# Patient Record
Sex: MALE | Employment: UNEMPLOYED | URBAN - METROPOLITAN AREA
[De-identification: names, ages, dates, MRNs, and addresses within clinical notes are randomized per-mention and may not be internally consistent; named-entity substitution may affect disease eponyms.]

---
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
## 2025-02-20 ENCOUNTER — HOSPITAL ENCOUNTER (EMERGENCY)
Age: 20
Discharge: HOME | End: 2025-02-20
Attending: EMERGENCY MEDICINE
Payer: MEDICAID

## 2025-02-20 VITALS
SYSTOLIC BLOOD PRESSURE: 91 MMHG | TEMPERATURE: 100 F | HEART RATE: 103 BPM | RESPIRATION RATE: 18 BRPM | DIASTOLIC BLOOD PRESSURE: 79 MMHG | BODY MASS INDEX: 23.49 KG/M2 | OXYGEN SATURATION: 97 % | WEIGHT: 155 LBS | HEIGHT: 68 IN

## 2025-02-20 DIAGNOSIS — J10.1 INFLUENZA A: Primary | ICD-10-CM

## 2025-02-20 DIAGNOSIS — H66.001 NON-RECURRENT ACUTE SUPPURATIVE OTITIS MEDIA OF RIGHT EAR WITHOUT SPONTANEOUS RUPTURE OF TYMPANIC MEMBRANE: ICD-10-CM

## 2025-02-20 PROCEDURE — 99283 EMERGENCY DEPT VISIT LOW MDM: CPT

## 2025-02-20 PROCEDURE — 94640 AIRWAY INHALATION TREATMENT: CPT

## 2025-02-20 RX ORDER — IBUPROFEN 600 MG/1
600 TABLET, FILM COATED ORAL EVERY 6 HOURS PRN
COMMUNITY

## 2025-02-20 RX ORDER — BENZONATATE 100 MG/1
100 CAPSULE ORAL 3 TIMES DAILY PRN
COMMUNITY

## 2025-02-20 RX ORDER — ALBUTEROL SULFATE 90 UG/1
2 INHALANT RESPIRATORY (INHALATION) ONCE
Status: COMPLETED | OUTPATIENT
Start: 2025-02-20 | End: 2025-02-20

## 2025-02-20 RX ORDER — ACETAMINOPHEN 500 MG
500 TABLET ORAL EVERY 6 HOURS PRN
COMMUNITY

## 2025-02-20 NOTE — ED PROVIDER NOTES
Patient Seen in: [facility] Department In [place]      History     Chief Complaint   Patient presents with    Cough/URI     Stated Complaint: flu for 5 days    Subjective:   18 yo male presents to the emergency department with c/o cough.  Patient states he started on Sunday with cough, nasal congestion, body aches and headaches.  He tested positive for influenza on Tuesday.  He states he has continued to have intermittent fevers and his cough is now more productive.  Grandma with him states she is concerned because one of her other grandkids just was diagnosed with pneumonia after having flu.  He has been taking Tylenol, ibuprofen and Tessalon Perles for his symptoms. He denies any ear pain, ear drainage, sore throat, difficulty swallowing, voice changes, shortness of breath, or chest pain.     The history is provided by the patient.         Objective:     History reviewed. No pertinent past medical history.           Past Surgical History:   Procedure Laterality Date    Repair of hydrocele,tunica                  Social History     Socioeconomic History    Marital status: Single   Tobacco Use    Smoking status: Never    Smokeless tobacco: Never   Vaping Use    Vaping status: Never Used   Substance and Sexual Activity    Alcohol use: Never    Drug use: Never                  Physical Exam     ED Triage Vitals [02/20/25 0813]   BP 91/79   Pulse 103   Resp 18   Temp 100 °F (37.8 °C)   Temp src Temporal   SpO2 97 %   O2 Device None (Room air)       Current Vitals:   Vital Signs  BP: 91/79  Pulse: 103  Resp: 18  Temp: 100 °F (37.8 °C)  Temp src: Temporal    Oxygen Therapy  SpO2: 97 %  O2 Device: None (Room air)        Physical Exam  Vitals and nursing note reviewed.   Constitutional:       General: He is not in acute distress.     Appearance: Normal appearance. He is normal weight. He is not ill-appearing.   HENT:      Head: Normocephalic and atraumatic.      Right Ear: Ear canal and external ear normal.  Tympanic membrane is erythematous and bulging.      Left Ear: Tympanic membrane, ear canal and external ear normal.      Nose: Congestion present.      Mouth/Throat:      Mouth: Mucous membranes are moist.      Pharynx: Oropharynx is clear.      Comments: Postnasal drainage.   Eyes:      Conjunctiva/sclera: Conjunctivae normal.      Pupils: Pupils are equal, round, and reactive to light.   Cardiovascular:      Rate and Rhythm: Normal rate and regular rhythm.      Pulses: Normal pulses.      Heart sounds: Normal heart sounds.   Pulmonary:      Effort: Pulmonary effort is normal. No respiratory distress.      Breath sounds: Normal breath sounds.   Musculoskeletal:         General: Normal range of motion.   Skin:     General: Skin is warm and dry.      Capillary Refill: Capillary refill takes less than 2 seconds.   Neurological:      General: No focal deficit present.      Mental Status: He is alert and oriented to person, place, and time.   Psychiatric:         Mood and Affect: Mood normal.         Behavior: Behavior normal.           ED Course   Labs Reviewed - No data to display            MDM        Medical Decision Making  19-year-old male with history and exam consistent with influenza as well as a right otitis media.  Will treat with p.o. antibiotics.  Patient can continue to take supportive management at home for flu symptoms.  No evidence of sepsis, otitis externa, ALEX, or mastoiditis.  Recommended use of antihistamines to help prevent reoccurrence of the ear infection and help with nasal congestion.  Did discuss at length with patient that the antibiotic will treat the ear infection and his note for the viral infection that he also has.  Patient to follow-up with his PCP in 1 week.    Risk  OTC drugs.  Prescription drug management.        Disposition and Plan     Clinical Impression:  1. Influenza A    2. Non-recurrent acute suppurative otitis media of right ear without spontaneous rupture of tympanic  membrane         Disposition:  Discharge  2/20/2025  8:40 am    Follow-up:  Verna Horn MD  [address] [place] [id number]  [phone]    Follow up in 1 week(s)            Medications Prescribed:  Discharge Medication List as of 2/20/2025  8:46 AM        START taking these medications    Details   amoxicillin clavulanate 875-125 MG Oral Tab Take 1 tablet by mouth 2 (two) times daily for 7 days., Normal, Disp-14 tablet, R-0                 Supplementary Documentation:

## 2025-02-20 NOTE — ED INITIAL ASSESSMENT (HPI)
Cough began on Sunday- positive flu test on tues at [facility]- cough and fevers remain  Tylenol and ibuprofen all day

## 2025-02-20 NOTE — DISCHARGE INSTRUCTIONS
Rest and drink plenty of fluids.    This will help to thin the mucous in the back of your throat.   Take Tylenol and/or ibuprofen as needed for pain or fever.   Use Zyrtec, Claritin, or Allegra to help with nasal drainage.   You can take this twice a day.   Use Flonase nasal spray one spray each nostril twice a day.   You can also take Sudafed to help with sinus pressure or pain.   Get the medication behind the pharmacy counter.   Take Robitussin or Delsym as needed for cough.   Use the albuterol inhaler with the spacer 2 to 4 puffs every 4 hours as needed to help with cough and congestion.  Start the antibiotic to help with the ear infection.  Realize that this will only help with the ear infection and as flu is a viral infection the antibiotic will not treat the viral infection.   Your symptoms should improve in the next 7-10 days; however, the cough can linger for much longer.     Thank you for choosing [facility] for your care.

## (undated) NOTE — LETTER
Date & Time: 12/11/2022, 1:51 PM  Patient: Mandy Mtz [facility]  Encounter Provider(s):    Yaw Kilgore MD       To Whom It May Concern:    Lesli Boswell was seen and treated in our department on 12/11/2022. He should not participate in gym or sports until 12/15/2022. Please allow extra time during passing period and if possible allow patient to change classes when there are less students in the halls to avoid further injury.      If you have any questions or concerns, please do not hesitate to call.        _____________________________  Physician/APC Signature

## (undated) NOTE — ED AVS SNAPSHOT
Parent/Legal Guardian Access to the Online [employer] Record of a Patient 15to 16Years Old  Return completed form by Secure email to [place] HIM/Medical Records Department: [email or web address].     Requirements and Procedures   Under [facility] MyChart ID and password with another person, that person may be able to view my or my child’s health information, and health information about someone who has authorized me as a MyChart proxy.    ·  I agree that it is my responsibility to select a confident Sign-Up Form and I agree to its terms.        Authorization Form     Please enter Patient’s information below:   Name (last, first, middle initial) __________________________________________   Gender  Male  Female    Last 4 Digits of Social Security Number Parent/Legal Guardian Signature                                  For Patient (1517 years of age)  I agree to allow my parent/legal guardian, named above, online access to my medical information currently available and that may become available as a result

## (undated) NOTE — ED AVS SNAPSHOT
Parent/Legal Guardian Access to the Online [place] Record of a Patient 15to 16Years Old  Return completed form by Secure email to [place] HIM/Medical Records Department: [email or web address]     Requirements and Procedures   Under [facility] MyChart ID and password with another person, that person may be able to view my or my child’s health information, and health information about someone who has authorized me as a MyChart proxy.    ·  I agree that it is my responsibility to select a confident Sign-Up Form and I agree to its terms.        Authorization Form     Please enter Patient’s information below:   Name (last, first, middle initial) __________________________________________   Gender  Male  Female    Last 4 Digits of Social Security Number Parent/Legal Guardian Signature                                  For Patient (1517 years of age)  I agree to allow my parent/legal guardian, named above, online access to my medical information currently available and that may become available as a result

## (undated) NOTE — LETTER
Date & Time: 4/26/2019, 9:29 PM  Patient: Abelino Dey  Encounter Provider(s):    Orlando Maynard MD       To Whom It May Concern:    Patrick Peterson was seen and treated in our department on 4/26/2019. No gym/sports until 5/1/19.     If you have any questions

## (undated) NOTE — LETTER
Date: 5/26/2024    Patient Name: Pranay Dey          To Whom it may concern:    This letter has been written at the patient's request. The above patient was seen at [facility] for treatment of a medical condition.    This patient should be excused from attending work/school from days missed.     The patient may return to work/school when fever free for 24hrs with the following limitations none.        Sincerely,    Olszewski,Kristen J, APRN

## (undated) NOTE — ED AVS SNAPSHOT
Kailash Srinivasan   MRN: [id number]    Department:  [facility] Emergency Department in [place]   Date of Visit:  4/26/2019           Disclosure     Insurance plans vary and the physician(s) referred by the ER may not be covered by your plan.  Please contac tell this physician (or your personal doctor if your instructions are to return to your personal doctor) about any new or lasting problems. The primary care or specialist physician will see patients referred from the [facility] Emergency Department.  Rashard De León

## (undated) NOTE — ED AVS SNAPSHOT
Rennyyajaira Wilson   MRN: [id number]    Department:  THE [facility] Emergency Department in [place]   Date of Visit:  2/15/2018           Disclosure     Insurance plans vary and the physician(s) referred by the ER may not be covered by your plan.  Please contact y tell this physician (or your personal doctor if your instructions are to return to your personal doctor) about any new or lasting problems. The primary care or specialist physician will see patients referred from the [facility] Emergency Department.  Emil Steel

## (undated) NOTE — LETTER
December 11, 2022    Patient: Butch Whitman   Date of Visit: 12/11/2022       To Whom It May Concern:    Martina Avila was seen and treated in our emergency department on 12/11/2022. He should not return to work until December 15 May return sooner if better. If you have any questions or concerns, please don't hesitate to call.        Encounter Provider(s):    Aldo Ocasio MD